# Patient Record
Sex: FEMALE | Race: WHITE | NOT HISPANIC OR LATINO
[De-identification: names, ages, dates, MRNs, and addresses within clinical notes are randomized per-mention and may not be internally consistent; named-entity substitution may affect disease eponyms.]

---
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
## 2021-05-03 ENCOUNTER — APPOINTMENT (OUTPATIENT)
Dept: OBGYN | Facility: CLINIC | Age: 21
End: 2021-05-03

## 2021-05-04 ENCOUNTER — LABORATORY RESULT (OUTPATIENT)
Age: 21
End: 2021-05-04

## 2021-05-04 ENCOUNTER — APPOINTMENT (OUTPATIENT)
Dept: OBGYN | Facility: CLINIC | Age: 21
End: 2021-05-04
Payer: COMMERCIAL

## 2021-05-04 VITALS
HEIGHT: 62 IN | RESPIRATION RATE: 16 BRPM | HEART RATE: 105 BPM | BODY MASS INDEX: 33.68 KG/M2 | SYSTOLIC BLOOD PRESSURE: 110 MMHG | WEIGHT: 183 LBS | OXYGEN SATURATION: 93 % | TEMPERATURE: 97.1 F | DIASTOLIC BLOOD PRESSURE: 70 MMHG

## 2021-05-04 PROCEDURE — 99212 OFFICE O/P EST SF 10 MIN: CPT | Mod: 25

## 2021-05-04 PROCEDURE — 36415 COLL VENOUS BLD VENIPUNCTURE: CPT

## 2021-05-04 PROCEDURE — 99202 OFFICE O/P NEW SF 15 MIN: CPT | Mod: 25

## 2021-05-04 PROCEDURE — 99072 ADDL SUPL MATRL&STAF TM PHE: CPT

## 2021-05-04 RX ORDER — LIDOCAINE HCL AND HYDROCORTISONE ACETATE 20; 20 MG/G; MG/G
2-2 CREAM RECTAL
Qty: 1 | Refills: 0 | Status: DISCONTINUED | COMMUNITY
Start: 2020-07-24 | End: 2021-05-04

## 2021-05-04 NOTE — PLAN
[FreeTextEntry1] : 19 yo presenting for annual and Junel re-prescription \par - Spoke with patient about safe sex practices, Gardasil vaccine, avoidance of alcohol/drug use and wearing a seatbelt\par - to return for Gardasil vaccine\par - STI panel sent today\par \par Jacquie Rizo

## 2021-05-04 NOTE — COUNSELING
[Nutrition/ Exercise/ Weight Management] : nutrition, exercise, weight management [Vitamins/Supplements] : vitamins/supplements [Breast Self Exam] : breast self exam [Drugs/Alcohol] : drugs, alcohol [Contraception/ Emergency Contraception/ Safe Sexual Practices] : contraception, emergency contraception, safe sexual practices [Sexual Abuse] : sexual abuse [Confidentiality] : confidentiality [STD (testing, results, tx)] : STD (testing, results, tx) [Vaccines] : vaccines [Influenza Vaccine] : influenza vaccine [HPV Vaccine] : HPV Vaccine

## 2021-05-04 NOTE — HISTORY OF PRESENT ILLNESS
[HIV Test offered] : HIV Test offered [Syphilis test offered] : Syphilis test offered [Gonorrhea test offered] : Gonorrhea test offered [Chlamydia test offered] : Chlamydia test offered [HPV test offered] : HPV test offered [LMP unknown] : LMP unknown [N] : Patient reports normal menses [Y] : Patient is sexually active [Currently Active] : currently active [Men] : men [No] : No [Patient would like to be screened for STIs] : Patient would like to be screened for STIs [FreeTextEntry1] : 19 yo LMP in February (light spotting) amenorrhea is normal for her since being on Junel. Presents today for annual. No current complaints.  [TextBox_31] : NA [TextBox_78] : patient elected to get the vaccine next year

## 2021-05-05 ENCOUNTER — APPOINTMENT (OUTPATIENT)
Dept: PEDIATRICS | Facility: CLINIC | Age: 21
End: 2021-05-05
Payer: COMMERCIAL

## 2021-05-05 VITALS — SYSTOLIC BLOOD PRESSURE: 126 MMHG | TEMPERATURE: 98 F | DIASTOLIC BLOOD PRESSURE: 79 MMHG | HEART RATE: 92 BPM

## 2021-05-05 DIAGNOSIS — R52 PAIN, UNSPECIFIED: ICD-10-CM

## 2021-05-05 DIAGNOSIS — Z86.59 PERSONAL HISTORY OF OTHER MENTAL AND BEHAVIORAL DISORDERS: ICD-10-CM

## 2021-05-05 DIAGNOSIS — R09.81 NASAL CONGESTION: ICD-10-CM

## 2021-05-05 DIAGNOSIS — Z00.00 ENCOUNTER FOR GENERAL ADULT MEDICAL EXAMINATION W/OUT ABNORMAL FINDINGS: ICD-10-CM

## 2021-05-05 DIAGNOSIS — Z86.19 PERSONAL HISTORY OF OTHER INFECTIOUS AND PARASITIC DISEASES: ICD-10-CM

## 2021-05-05 DIAGNOSIS — Z87.19 PERSONAL HISTORY OF OTHER DISEASES OF THE DIGESTIVE SYSTEM: ICD-10-CM

## 2021-05-05 DIAGNOSIS — H93.8X3 OTHER SPECIFIED DISORDERS OF EAR, BILATERAL: ICD-10-CM

## 2021-05-05 PROCEDURE — 99215 OFFICE O/P EST HI 40 MIN: CPT

## 2021-05-05 PROCEDURE — 93005 ELECTROCARDIOGRAM TRACING: CPT

## 2021-05-05 PROCEDURE — 99072 ADDL SUPL MATRL&STAF TM PHE: CPT

## 2021-05-05 NOTE — HISTORY OF PRESENT ILLNESS
[de-identified] : High pulse rate [FreeTextEntry6] : Here for periods of "fast heart beat" starting since last August, comes "out of nowhere", occurs when she is laying down, walking in her apartment, does not occur with exercise.  Occurring more frequently in the past 2 months.  Feels her hear beating fast but not pounding, immediately accompanied by feeling hot and very flushed, then feels it is hard to breath.  Resolved in a few minutes.  Occasionally feels a little light headed and dizzy, but usually just heat sensation and short of breath.  Denies chest pain.  Wears a fitbit and her heart rate goes up to 120 to 135.  Endorses hesting heart is low 100's, often 110-115 when sitting down (in office today HR 92).  \joel Attends [employer],  [employer] program, [place]. Denies feeling anxious or stressed.  Denies any stressful or life changing event.  School ended and she is home for the summer, no plans. \par Exercise: Walks to classes, 10,000 steps a day, was exercising about 2-3 times a week until last week, was walking/jogging on treadmill.  Has gained almost 30 lbs since last year. \joel Had panic attacks in middle school.  Denies anxiety symptoms since early high school.  \par Brother Jey has anxiety.  \par Denies family history of thyroid disorder and cardiac problems. \par Denies smoking or substance use. Rarely drinks coffee or caffeine containing foods. \par Living with 1 roommate, switching apartments soon.  \par \par

## 2021-05-05 NOTE — DISCUSSION/SUMMARY
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  She has a prior history of panic attacks when she was in middle school.\par She has gained 28 lbs in the past year.  There is no family history of cardiac disease, there is a family history of anxiety. \par After visit, mother came as requested and plan reviewed with mother and patient.  We discussed at length most common cause of her symptoms is panic attack but as it is associated on occasion with dizziness important to rule out other causes.  \par Discussed a panic attack is a sudden intense fear that reaches a peak within minutes.  Symptoms of a panic attack include palpitations/pounding heart, sweating, trembling or shaking, sensation of shortness of breath or smothering, feeling of choking, chest pain or discomfort, nausea or abdominal distress, feeling dizzy, light-headed, or faint, chills and heat sensations, numbness or tingling sensations, fear of losing control, fear of dying. \par  A panic disorder is when there are recurrent panic attacks, the child or adolescent has persistent worry about additional panic attacks, and this worry lasts longer than 1 month. \par Panic attacks appear to occurs without an obvious trigger, though often a trigger a few month prior can be identified.\par \par Discussed importance of healthy weight, emphasized that being overweight can lead to future problems including hypertension and diabetes. Discussed trying to incorporate healthy lifestyle changes, including a more healthy diet and becoming more active.  Recommended starting with small manageable goals to be more likely to be successful. \par \par \par PLAN:\par -12 leak EKG ordered; will go to [facility] for testing; contact info given.\par -Labs to rule out organic causes and for BMI >30:  Thyroid labs, lipid panel, fasting glucose, Hgb A1C, ALT and AST.  \par -Referred to cardiologist.\par -If cardiology evaluation negative, revisit  possible panic attack diagnosis and 1s line treatment is CBT therapy. \par -Weight loss/nutritional counseling.  Avoid caffeine. \par

## 2021-05-07 ENCOUNTER — OUTPATIENT (OUTPATIENT)
Dept: OUTPATIENT SERVICES | Facility: HOSPITAL | Age: 21
LOS: 1 days | End: 2021-05-07
Payer: COMMERCIAL

## 2021-05-07 DIAGNOSIS — R42 DIZZINESS AND GIDDINESS: ICD-10-CM

## 2021-05-07 LAB
ALT SERPL-CCNC: 22 U/L
APPEARANCE: CLEAR
AST SERPL-CCNC: 19 U/L
BACTERIA: ABNORMAL
BASOPHILS # BLD AUTO: 0.03 K/UL
BASOPHILS NFR BLD AUTO: 0.3 %
BILIRUBIN URINE: NEGATIVE
BLOOD URINE: NORMAL
CHOLEST SERPL-MCNC: 194 MG/DL
COLOR: NORMAL
EOSINOPHIL # BLD AUTO: 0.12 K/UL
EOSINOPHIL NFR BLD AUTO: 1.3 %
ESTIMATED AVERAGE GLUCOSE: 103 MG/DL
GLUCOSE BS SERPL-MCNC: 84 MG/DL
GLUCOSE QUALITATIVE U: NEGATIVE
HBA1C MFR BLD HPLC: 5.2 %
HCT VFR BLD CALC: 41.7 %
HDLC SERPL-MCNC: 76 MG/DL
HGB BLD-MCNC: 13.3 G/DL
HYALINE CASTS: 3 /LPF
IMM GRANULOCYTES NFR BLD AUTO: 0.3 %
KETONES URINE: NEGATIVE
LDLC SERPL CALC-MCNC: 100 MG/DL
LEUKOCYTE ESTERASE URINE: NEGATIVE
LYMPHOCYTES # BLD AUTO: 3.74 K/UL
LYMPHOCYTES NFR BLD AUTO: 39.9 %
MAN DIFF?: NORMAL
MCHC RBC-ENTMCNC: 28.4 PG
MCHC RBC-ENTMCNC: 31.9 GM/DL
MCV RBC AUTO: 88.9 FL
MICROSCOPIC-UA: NORMAL
MONOCYTES # BLD AUTO: 0.72 K/UL
MONOCYTES NFR BLD AUTO: 7.7 %
NEUTROPHILS # BLD AUTO: 4.74 K/UL
NEUTROPHILS NFR BLD AUTO: 50.5 %
NITRITE URINE: NEGATIVE
NONHDLC SERPL-MCNC: 119 MG/DL
PH URINE: 7
PLATELET # BLD AUTO: 362 K/UL
PROTEIN URINE: NORMAL
RBC # BLD: 4.69 M/UL
RBC # FLD: 13.1 %
RED BLOOD CELLS URINE: 11 /HPF
SPECIFIC GRAVITY URINE: 1.02
SQUAMOUS EPITHELIAL CELLS: 5 /HPF
T3FREE SERPL-MCNC: 3.62 PG/ML
T4 FREE SERPL-MCNC: 0.9 NG/DL
THYROGLOB AB SERPL-ACNC: <20 IU/ML
THYROPEROXIDASE AB SERPL IA-ACNC: 27.7 IU/ML
TRIGL SERPL-MCNC: 92 MG/DL
TSH SERPL-ACNC: 1.59 UIU/ML
UROBILINOGEN URINE: NORMAL
WBC # FLD AUTO: 9.38 K/UL
WHITE BLOOD CELLS URINE: 3 /HPF

## 2021-05-07 PROCEDURE — 93010 ELECTROCARDIOGRAM REPORT: CPT

## 2021-05-07 PROCEDURE — 93005 ELECTROCARDIOGRAM TRACING: CPT

## 2021-07-25 DIAGNOSIS — Z87.898 PERSONAL HISTORY OF OTHER SPECIFIED CONDITIONS: ICD-10-CM

## 2021-07-25 DIAGNOSIS — E55.9 VITAMIN D DEFICIENCY, UNSPECIFIED: ICD-10-CM

## 2021-07-25 DIAGNOSIS — R00.0 TACHYCARDIA, UNSPECIFIED: ICD-10-CM

## 2021-07-25 DIAGNOSIS — Z01.419 ENCOUNTER FOR GYNECOLOGICAL EXAMINATION (GENERAL) (ROUTINE) W/OUT ABNORMAL FINDINGS: ICD-10-CM

## 2021-07-25 DIAGNOSIS — Z20.822 CONTACT WITH AND (SUSPECTED) EXPOSURE TO COVID-19: ICD-10-CM

## 2021-07-25 DIAGNOSIS — Z00.129 ENCOUNTER FOR ROUTINE CHILD HEALTH EXAMINATION W/OUT ABNORMAL FINDINGS: ICD-10-CM

## 2021-07-28 ENCOUNTER — APPOINTMENT (OUTPATIENT)
Dept: PEDIATRICS | Facility: CLINIC | Age: 21
End: 2021-07-28
Payer: COMMERCIAL

## 2021-07-28 VITALS
HEIGHT: 62 IN | DIASTOLIC BLOOD PRESSURE: 80 MMHG | WEIGHT: 193 LBS | SYSTOLIC BLOOD PRESSURE: 123 MMHG | TEMPERATURE: 98 F | BODY MASS INDEX: 35.51 KG/M2 | HEART RATE: 98 BPM

## 2021-07-28 DIAGNOSIS — E66.9 OVERWEIGHT: ICD-10-CM

## 2021-07-28 DIAGNOSIS — E66.3 OVERWEIGHT: ICD-10-CM

## 2021-07-28 DIAGNOSIS — Z00.3 ENCOUNTER FOR EXAMINATION FOR ADOLESCENT DEVELOPMENT STATE: ICD-10-CM

## 2021-07-28 DIAGNOSIS — Z86.59 PERSONAL HISTORY OF OTHER MENTAL AND BEHAVIORAL DISORDERS: ICD-10-CM

## 2021-07-28 PROCEDURE — 99395 PREV VISIT EST AGE 18-39: CPT

## 2021-07-28 PROCEDURE — 96127 BRIEF EMOTIONAL/BEHAV ASSMT: CPT

## 2021-07-28 PROCEDURE — 96160 PT-FOCUSED HLTH RISK ASSMT: CPT | Mod: 59

## 2021-07-28 NOTE — DISCUSSION/SUMMARY
[FreeTextEntry1] : 20 year old DAVID GUTIÉRREZ presents for her annual well visit.\par Normal PE except weight gain since last year.  She is doing much better, has recently started an exercise program and eating healthy.  I applauded her efforts and encouraged her to continue for the long run.\par Seen May for panic attack symptoms.  Has not recurred, denies any anxiety/depression symptoms. \par Follow by Dr Rizo- on OCPs, recently increased due to heavy menstrual period, has not yet started.\par Declines recommended vaccines including Gardasil, Hep A and Trumemba at this time but asking a lot of questions about them.  Encouraged the vaccines including Covid vaccine.  Patient will think about ti.  \par Routine lab work was done 05/06/21. No lab work needed.\par Return in 1 year for well visit. \par  \par Discussed importance of healthy weight, emphasized that being overweight can lead to future problems including hypertension and diabetes. Discussed trying to incorporate healthy lifestyle changes, including a more healthy diet and becoming more active.  Recommended starting with small manageable goals to be more likely to be successful. \par \par \par \par \par

## 2021-07-28 NOTE — HISTORY OF PRESENT ILLNESS
[LMP: _____] : LMP: [unfilled] [Irregular menses] : no irregular menses [Heavy Bleeding] : no heavy bleeding [Painful Cramps] : no painful cramps [Sleep Concerns] : no sleep concerns [Has concerns about body or appearance] : has concerns about body or appearance [Uses electronic nicotine delivery system] : does not use electronic nicotine delivery system [Uses tobacco] : does not use tobacco [Uses drugs] : does not use drugs  [Drinks alcohol] : does not drink alcohol [Impaired/distracted driving] : no impaired/distracted driving [Has peer relationships free of violence] : has peer relationships free of violence [Has problems with sleep] : does not have problems with sleep [Gets depressed, anxious, or irritable/has mood swings] : does not get depressed, anxious, or irritable/has mood swings [Has thought about hurting self or considered suicide] : has not thought about hurting self or considered suicide [de-identified] : Refused Hep A and Gardasil past years, refuses Mari [FreeTextEntry8] : On Junel FE 1-24, GYN Dr Rizo, initially on for dysmenorrhea.  [de-identified] : [employer] business [employer], minor in socially [de-identified] : Has started eating much healthier diet the past few weeks, very commited to changing eating habits.  [de-identified] : [employer] gym, a lot of walking at school. [de-identified] : Denies, rarely has a glass of wine [de-identified] : Has boyfriend Michael for past 6 months, monogamous.  Offered STI/HIV testing.  [de-identified] : History of anxiety/depression- very good place right now [FreeTextEntry1] : Seen 05/07/21 for likely panic attack symptoms, EKG normal, thyroid labs normal, referred to cardio.  History of anxiety/panic attacts in  middle school.Recommended therapist/CBT.  She reports feeling much better, thinks it was related to poor eating, not exercising.  \par Going back to school in [place] on Saturday. \par \par Had 28 lb weight gain prior year, going to school in [place].  This past year 10 lb gain, states she just lost weight.  Recently started an exercise weight [employer]. \par \par No lab work needed; had routine lab work in May.\par \par Hx of hemorrhoids at last well visit.  Has not recurred.

## 2022-05-23 ENCOUNTER — APPOINTMENT (OUTPATIENT)
Dept: OBGYN | Facility: CLINIC | Age: 22
End: 2022-05-23
Payer: COMMERCIAL

## 2022-05-23 ENCOUNTER — NON-APPOINTMENT (OUTPATIENT)
Age: 22
End: 2022-05-23

## 2022-05-23 VITALS
TEMPERATURE: 97.3 F | BODY MASS INDEX: 37.54 KG/M2 | WEIGHT: 204 LBS | DIASTOLIC BLOOD PRESSURE: 72 MMHG | HEART RATE: 100 BPM | OXYGEN SATURATION: 98 % | HEIGHT: 62 IN | SYSTOLIC BLOOD PRESSURE: 116 MMHG

## 2022-05-23 LAB
HCG UR QL: NEGATIVE
QUALITY CONTROL: YES

## 2022-05-23 PROCEDURE — 99395 PREV VISIT EST AGE 18-39: CPT

## 2022-05-23 NOTE — COUNSELING
[Nutrition/ Exercise/ Weight Management] : nutrition, exercise, weight management [Bladder Hygiene] : bladder hygiene [Contraception/ Emergency Contraception/ Safe Sexual Practices] : contraception, emergency contraception, safe sexual practices [Confidentiality] : confidentiality [STD (testing, results, tx)] : STD (testing, results, tx)

## 2022-05-23 NOTE — HISTORY OF PRESENT ILLNESS
[FreeTextEntry1] : 22 yo P___ with LMP ___ presents today for well woman exam.\par \par \par POB: denies\par PGYN: on OCP since 18--at that time painful, denies pelvic infections\par PMH: denies\par PSH: denies\par Med: junel\par All: NKMA\par SH: denies\par \par

## 2022-05-23 NOTE — PHYSICAL EXAM
[Chaperone Present] : A chaperone was present in the examining room during all aspects of the physical examination [Appropriately responsive] : appropriately responsive [Alert] : alert [No Acute Distress] : no acute distress [Soft] : soft [Non-tender] : non-tender [Non-distended] : non-distended [No HSM] : No HSM [No Lesions] : no lesions [No Mass] : no mass [Oriented x3] : oriented x3 [Examination Of The Breasts] : a normal appearance [Normal] : normal [No Masses] : no breast masses were palpable

## 2022-05-23 NOTE — PLAN
[FreeTextEntry1] : \par \par I spent the time noted on the day of this patient encounter preparing for, providing and documenting the above E/M service and counseling and educate patient on differential, workup, disease course, and treatment/management. Education was provided to the patient during this encounter. All questions and concerns were answered and addressed in detail.\par \par Jacquie Rizo MD\par

## 2023-05-24 ENCOUNTER — NON-APPOINTMENT (OUTPATIENT)
Age: 23
End: 2023-05-24

## 2023-05-25 ENCOUNTER — APPOINTMENT (OUTPATIENT)
Dept: OBGYN | Facility: CLINIC | Age: 23
End: 2023-05-25
Payer: COMMERCIAL

## 2023-05-25 VITALS
SYSTOLIC BLOOD PRESSURE: 122 MMHG | HEART RATE: 107 BPM | DIASTOLIC BLOOD PRESSURE: 80 MMHG | WEIGHT: 191 LBS | TEMPERATURE: 97.3 F | OXYGEN SATURATION: 98 % | HEIGHT: 62 IN | BODY MASS INDEX: 35.15 KG/M2

## 2023-05-25 DIAGNOSIS — Z01.419 ENCOUNTER FOR GYNECOLOGICAL EXAMINATION (GENERAL) (ROUTINE) W/OUT ABNORMAL FINDINGS: ICD-10-CM

## 2023-05-25 DIAGNOSIS — R22.30 LOCALIZED SWELLING, MASS AND LUMP, UNSPECIFIED UPPER LIMB: ICD-10-CM

## 2023-05-25 LAB
HCG UR QL: NEGATIVE
QUALITY CONTROL: YES

## 2023-05-25 PROCEDURE — 99395 PREV VISIT EST AGE 18-39: CPT

## 2023-05-25 NOTE — PLAN
[FreeTextEntry1] : \par \par I spent the time noted on the day of this patient encounter preparing for, providing and documenting the above service. I have  counseled and educated the patient on the differential, workup, disease course, and treatment/management plan. Education was provided to the patient during this encounter. All questions and concerns were answered and addressed in detail.\par \par Jacquie Rizo MD\par

## 2023-05-25 NOTE — PHYSICAL EXAM
[Appropriately responsive] : appropriately responsive [Alert] : alert [No Acute Distress] : no acute distress [No Lymphadenopathy] : no lymphadenopathy [Soft] : soft [Non-tender] : non-tender [Non-distended] : non-distended [No HSM] : No HSM [No Lesions] : no lesions [No Mass] : no mass [Oriented x3] : oriented x3 [Examination Of The Breasts] : a normal appearance [No Masses] : no breast masses were palpable [Axillary Lymph Nodes Enlarged On The Right] : enlarged node on the right [Mobile] : mobile [Labia Majora] : normal [Labia Minora] : normal [Normal] : normal [Uterine Adnexae] : normal

## 2023-05-25 NOTE — HISTORY OF PRESENT ILLNESS
[FreeTextEntry1] : 23 yo here today for annual, NC--stopped Junel last month, likes being off of it.

## 2023-05-30 ENCOUNTER — NON-APPOINTMENT (OUTPATIENT)
Age: 23
End: 2023-05-30

## 2023-05-31 ENCOUNTER — APPOINTMENT (OUTPATIENT)
Dept: ULTRASOUND IMAGING | Facility: CLINIC | Age: 23
End: 2023-05-31
Payer: COMMERCIAL

## 2023-05-31 PROCEDURE — 76882 US LMTD JT/FCL EVL NVASC XTR: CPT | Mod: RT

## 2023-06-02 ENCOUNTER — NON-APPOINTMENT (OUTPATIENT)
Age: 23
End: 2023-06-02

## 2023-06-07 LAB
C TRACH RRNA SPEC QL NAA+PROBE: NOT DETECTED
CANDIDA VAG CYTO: NOT DETECTED
CYTOLOGY CVX/VAG DOC THIN PREP: NORMAL
G VAGINALIS+PREV SP MTYP VAG QL MICRO: NOT DETECTED
N GONORRHOEA RRNA SPEC QL NAA+PROBE: NOT DETECTED
SOURCE AMPLIFICATION: NORMAL
T VAGINALIS VAG QL WET PREP: NOT DETECTED

## 2023-10-10 ENCOUNTER — APPOINTMENT (OUTPATIENT)
Dept: OBGYN | Facility: CLINIC | Age: 23
End: 2023-10-10
Payer: COMMERCIAL

## 2023-10-10 VITALS
DIASTOLIC BLOOD PRESSURE: 60 MMHG | BODY MASS INDEX: 31.65 KG/M2 | WEIGHT: 172 LBS | SYSTOLIC BLOOD PRESSURE: 112 MMHG | HEIGHT: 62 IN | HEART RATE: 99 BPM | OXYGEN SATURATION: 99 % | TEMPERATURE: 97.7 F

## 2023-10-10 DIAGNOSIS — N76.4 ABSCESS OF VULVA: ICD-10-CM

## 2023-10-10 PROCEDURE — 56405 I&D VULVA/PERINEAL ABSCESS: CPT

## 2023-10-10 PROCEDURE — 99212 OFFICE O/P EST SF 10 MIN: CPT | Mod: 25

## 2023-10-11 LAB
HCG UR QL: NEGATIVE
QUALITY CONTROL: YES

## 2023-10-16 LAB — BACTERIA WND CULT: ABNORMAL

## 2024-06-05 ENCOUNTER — APPOINTMENT (OUTPATIENT)
Dept: BARIATRICS | Facility: CLINIC | Age: 24
End: 2024-06-05
Payer: COMMERCIAL

## 2024-06-05 VITALS
TEMPERATURE: 97.9 F | DIASTOLIC BLOOD PRESSURE: 60 MMHG | SYSTOLIC BLOOD PRESSURE: 110 MMHG | OXYGEN SATURATION: 99 % | HEART RATE: 83 BPM | HEIGHT: 62 IN | BODY MASS INDEX: 32.39 KG/M2 | WEIGHT: 176 LBS

## 2024-06-05 DIAGNOSIS — Z86.39 PERSONAL HISTORY OF OTHER ENDOCRINE, NUTRITIONAL AND METABOLIC DISEASE: ICD-10-CM

## 2024-06-05 DIAGNOSIS — R63.5 ABNORMAL WEIGHT GAIN: ICD-10-CM

## 2024-06-05 DIAGNOSIS — E46 UNSPECIFIED PROTEIN-CALORIE MALNUTRITION: ICD-10-CM

## 2024-06-05 DIAGNOSIS — R63.8 OTHER SYMPTOMS AND SIGNS CONCERNING FOOD AND FLUID INTAKE: ICD-10-CM

## 2024-06-05 DIAGNOSIS — R63.2 POLYPHAGIA: ICD-10-CM

## 2024-06-05 DIAGNOSIS — Z76.89 PERSONS ENCOUNTERING HEALTH SERVICES IN OTHER SPECIFIED CIRCUMSTANCES: ICD-10-CM

## 2024-06-05 PROCEDURE — 99205 OFFICE O/P NEW HI 60 MIN: CPT

## 2024-06-05 PROCEDURE — G2211 COMPLEX E/M VISIT ADD ON: CPT

## 2024-06-05 RX ORDER — NORETHINDRONE ACETATE AND ETHINYL ESTRADIOL AND FERROUS FUMARATE 1MG-20(24)
1-20 KIT ORAL
Qty: 1 | Refills: 0 | Status: COMPLETED | COMMUNITY
Start: 2018-07-27 | End: 2024-06-05

## 2024-06-05 RX ORDER — CETIRIZINE HYDROCHLORIDE 10 MG/1
TABLET ORAL
Refills: 0 | Status: ACTIVE | COMMUNITY

## 2024-06-05 RX ORDER — NORETHINDRONE ACETATE AND ETHINYL ESTRADIOL AND FERROUS FUMARATE 1MG-20(24)
1-20 KIT ORAL DAILY
Qty: 3 | Refills: 3 | Status: COMPLETED | COMMUNITY
Start: 2021-05-04 | End: 2024-06-05

## 2024-06-05 NOTE — HISTORY OF PRESENT ILLNESS
[de-identified] : DAVID MILTON is a 23 year old F with a long-standing h/o obesity, who presents today for initial evaluation for weight management options. Heaviest/current wt 215lbs, lowest wt 140 lbs. Goal weight: 125-135 Diets/exercise programs tried in the past: [place] diet, exercise Weight loss medications tried in the past: none Reason For stopping weight loss medication if applicable: n/a History of bariatric surgery: no Obesity comorbidities: n/a   Reports no history of substance abuse, significant anxiety, uncontrolled blood pressure, pancreatitis, gallstones, kidney stones. No personal or family history of MEN syndrome, or medullary thyroid cancer.   No abdominal pain, nausea/vomiting, diarrhea/constipation   Health Screenings: Last colonoscopy: no Last mammogram: no Last PAP smear: 2023   ***Females*** Plans for future pregnancy: yes Form(s) of contraception: condoms Currently breastfeeding if applicable: no   Current dietary lifestyle: Breakfast: Greek yogurt, 2 egg omelet with veggies sometimes in a wrap, or coffee Lunch: sandwich, salad, leftovers Dinner: protein and veg, wrap, pasta  Snacks: chips, fruit Drinks: water   Activity Lifestyle: Sleep: 6-8 Physical activity/exercise: 3-5 days, running and weight lifting Work: unemployed Lives with: parents , splits time at both houses with brother Smoking/ETOH: ETOH socially   Obstacles to losing weight: unhealthy eating habits

## 2024-06-05 NOTE — ASSESSMENT
[FreeTextEntry1] : DAVID MILTON is a 23 year old F with a long-standing h/o obesity, who presents today for initial evaluation for weight management options. I had a lengthy discussion with the patient regarding nutrition, exercise, and weight loss medications. The patient qualifies for and is most interested in weight loss medications. I had a detailed discussion regarding the different medication options including risks and side effects. ------- Health maintenance and behavioral/nutrition counseling for obesity: An additional 30 minutes of the visit was spent counseling the patient regarding need for aggressive weight loss and behavior modification including nutrition and exercise.   I educated the patient regarding proper eating habits, including which foods to eat and avoid based on the patient's current nutrition and eating habits as detailed above in the HPI. I emphasized the importance of making healthier food choices including fresh fruits and vegetables, lean meats and protein sources. I recommended front loading calories, incorporating whole grains, and eliminating fast foods. I also discussed the importance of avoiding fried/fatty foods and foods containing high sugar content including juices/shakes/sodas/desserts.  I encouraged beginning an exercise program and recommended cardiovascular exercises along with strength training to build lean muscle. I made suggestions on different types of exercises to try. Patient will work on the following: -Meet with nutritionist -Eliminate snacks -Focus on eating 3 well-balanced meals during the daytime with appropriate portion size -Cooking fresh meals rather than take out/processed/ready-made foods -Incorporating exercise ------------  Patient interested in beginning Wegovy, pending insurance authorization and labs Discussed with the pt of the warnings of pregnancy while on Wegovy :  - instructed pt to avoid planned pregnancy and use 2 forms of contraception  - advised pt to stop Wegovy immediately if becomes pregnant Pt verbalized understanding of the above   Patient offered appointment with our psychologist given unhealthy overeating at times, however not interested at this time. Would first like to try medication as she feels if her cravings are curbed then she would eat healthier. Patient educated to call with questions/concerns All questions answered Return to office 3 weeks after beginning Wegovy, call right away with any side effects   Additional time spent before and after visit reviewing chart.

## 2024-06-05 NOTE — PHYSICAL EXAM
[Obese, well nourished, in no acute distress] : obese, well nourished, in no acute distress [Normal] : affect appropriate [de-identified] : Anicteric, no conjunctival injection or drainage noted [de-identified] : Supple, no obvious palpable masses or lymphadenopathy noted [de-identified] : Nonlabored respirations, equal chest rise, no audible wheezing [de-identified] : Regular rate and rhythm [de-identified] : no obvious deformity [de-identified] : Soft, obese, nontender. No obvious hernias.

## 2024-06-06 LAB
ALBUMIN SERPL ELPH-MCNC: 4.5 G/DL
ALP BLD-CCNC: 66 U/L
ALT SERPL-CCNC: 12 U/L
ANION GAP SERPL CALC-SCNC: 12 MMOL/L
AST SERPL-CCNC: 17 U/L
BASOPHILS # BLD AUTO: 0.04 K/UL
BASOPHILS NFR BLD AUTO: 0.5 %
BILIRUB SERPL-MCNC: <0.2 MG/DL
BUN SERPL-MCNC: 10 MG/DL
CALCIUM SERPL-MCNC: 9.6 MG/DL
CHLORIDE SERPL-SCNC: 104 MMOL/L
CHOLEST SERPL-MCNC: 137 MG/DL
CO2 SERPL-SCNC: 23 MMOL/L
CREAT SERPL-MCNC: 0.68 MG/DL
EGFR: 126 ML/MIN/1.73M2
EOSINOPHIL # BLD AUTO: 0.21 K/UL
EOSINOPHIL NFR BLD AUTO: 2.6 %
ESTIMATED AVERAGE GLUCOSE: 103 MG/DL
GLUCOSE SERPL-MCNC: 88 MG/DL
HBA1C MFR BLD HPLC: 5.2 %
HCG SERPL-MCNC: <1 MIU/ML
HCT VFR BLD CALC: 39.8 %
HDLC SERPL-MCNC: 52 MG/DL
HGB BLD-MCNC: 13.3 G/DL
IMM GRANULOCYTES NFR BLD AUTO: 0.4 %
LDLC SERPL CALC-MCNC: 70 MG/DL
LYMPHOCYTES # BLD AUTO: 2.99 K/UL
LYMPHOCYTES NFR BLD AUTO: 36.9 %
MAN DIFF?: NORMAL
MCHC RBC-ENTMCNC: 28.9 PG
MCHC RBC-ENTMCNC: 33.4 GM/DL
MCV RBC AUTO: 86.5 FL
MONOCYTES # BLD AUTO: 0.68 K/UL
MONOCYTES NFR BLD AUTO: 8.4 %
NEUTROPHILS # BLD AUTO: 4.16 K/UL
NEUTROPHILS NFR BLD AUTO: 51.2 %
NONHDLC SERPL-MCNC: 85 MG/DL
PLATELET # BLD AUTO: 327 K/UL
POTASSIUM SERPL-SCNC: 5 MMOL/L
PROT SERPL-MCNC: 7.3 G/DL
RBC # BLD: 4.6 M/UL
RBC # FLD: 13.2 %
SODIUM SERPL-SCNC: 139 MMOL/L
TRIGL SERPL-MCNC: 78 MG/DL
TSH SERPL-ACNC: 1.3 UIU/ML
WBC # FLD AUTO: 8.11 K/UL

## 2024-06-12 RX ORDER — SEMAGLUTIDE 0.25 MG/.5ML
0.25 INJECTION, SOLUTION SUBCUTANEOUS
Qty: 1 | Refills: 0 | Status: DISCONTINUED | COMMUNITY
Start: 2024-06-06 | End: 2024-06-12

## 2024-06-24 ENCOUNTER — APPOINTMENT (OUTPATIENT)
Dept: BARIATRICS | Facility: CLINIC | Age: 24
End: 2024-06-24

## 2024-07-02 ENCOUNTER — APPOINTMENT (OUTPATIENT)
Dept: BARIATRICS | Facility: CLINIC | Age: 24
End: 2024-07-02
Payer: COMMERCIAL

## 2024-07-02 VITALS
DIASTOLIC BLOOD PRESSURE: 68 MMHG | BODY MASS INDEX: 31.52 KG/M2 | WEIGHT: 171.3 LBS | HEART RATE: 80 BPM | SYSTOLIC BLOOD PRESSURE: 108 MMHG | HEIGHT: 62 IN

## 2024-07-02 DIAGNOSIS — R63.4 ABNORMAL WEIGHT LOSS: ICD-10-CM

## 2024-07-02 DIAGNOSIS — E66.9 OBESITY, UNSPECIFIED: ICD-10-CM

## 2024-07-02 DIAGNOSIS — Z79.899 OTHER LONG TERM (CURRENT) DRUG THERAPY: ICD-10-CM

## 2024-07-02 PROCEDURE — 99214 OFFICE O/P EST MOD 30 MIN: CPT

## 2024-07-23 RX ORDER — SEMAGLUTIDE 0.5 MG/.5ML
0.5 INJECTION, SOLUTION SUBCUTANEOUS
Qty: 1 | Refills: 0 | Status: ACTIVE | COMMUNITY
Start: 2024-07-02 | End: 1900-01-01

## 2024-08-19 ENCOUNTER — APPOINTMENT (OUTPATIENT)
Dept: BARIATRICS | Facility: CLINIC | Age: 24
End: 2024-08-19

## 2024-09-18 ENCOUNTER — APPOINTMENT (OUTPATIENT)
Dept: BARIATRICS | Facility: CLINIC | Age: 24
End: 2024-09-18